# Patient Record
(demographics unavailable — no encounter records)

---

## 2025-04-23 NOTE — HISTORY OF PRESENT ILLNESS
[FreeTextEntry1] : 32yo M with autism presents for a follow-up visit. He had a SPLIT study 6/10/22 which was indicative of moderate ALVARO -- AHI 28.7/h. Optimal CPAP pressure was determined to be 10 cm h20. He is using CPAP on most nights.  He is accompanied by the group home attendant who states that patient is tolerating the Full-Face Mask and pressures well. Staff member states that the patient is alert and maintains wakefulness during the day program. Currently, the patient is alert and awake in the office. As per attendant, weight has been overall stable. Denies other interim changes to his health.   Data from [facility] was reviewed and is as follows: DME -- [facility]  Device -- Resmed Airsense 11AuroSet  Percent days with device usage in last 30 days > 4h -- 67% Average usage (days used) -- 4 h 50min Average treatment related SRAVANI -- 0.7/h Mask leakage -- excessive  Pressure --10 cm h20 EPR 1  [CPAP: ___ cmH2O] : CPAP: [unfilled] cmH2O [Nocturnal Oxygen] : The patient does not use nocturnal oxygen

## 2025-04-23 NOTE — REVIEW OF SYSTEMS
[Fatigue] : no fatigue [Snoring] : no snoring [Witnessed Apneas] : no witnessed apnea [A.M. Dry Mouth] : no a.m. dry mouth [CHF] : no congestive heart failure [Obesity] : obesity [Difficulty Maintaining Sleep] : no difficulty maintaining sleep [FreeTextEntry3] : obtained ROS from group home attendantTamy

## 2025-04-23 NOTE — PHYSICAL EXAM
[General Appearance - In No Acute Distress] : no acute distress [Normal Conjunctiva] : the conjunctiva exhibited no abnormalities [Low Lying Soft Palate] : low lying soft palate [IV] : IV [Heart Rate And Rhythm] : heart rate was normal and rhythm regular [Heart Sounds] : normal S1 and S2 [Murmurs] : no murmurs [] : no respiratory distress [Respiration, Rhythm And Depth] : normal respiratory rhythm and effort [Exaggerated Use Of Accessory Muscles For Inspiration] : no accessory muscle use [Auscultation Breath Sounds / Voice Sounds] : lungs were clear to auscultation bilaterally [Involuntary Movements] : no involuntary movements were seen [Nail Clubbing] : no clubbing of the fingernails [Non-Pitting] : non-pitting [Skin Color & Pigmentation] : normal skin color and pigmentation [No Focal Deficits] : no focal deficits [FreeTextEntry1] : autism

## 2025-04-23 NOTE — ASSESSMENT
[FreeTextEntry1] : 32yo M with autism presents for a follow-up visit. He had a SPLIT study 6/10/22 which was indicative of moderate ALVARO -- AHI 28.7/h. Optimal CPAP pressure was determined to be 10 cm h20. He is using CPAP on most nights.  He is accompanied by the group home attendant who states that patient is tolerating the Full-Face Mask and pressures well. She states that the patient is alert and maintains wakefulness during the day program. Currently, the patient is alert and awake in the office. As per attendant, weight has been overall stable. Denies other interim changes to his health.   Data from [employer] was reviewed and is as follows: DME -- [facility]  Device -- Resmed Airsense 11AuroSet  Percent days with device usage in last 30 days > 4h -- 67% Average usage (days used) -- 4 h 50min Average treatment related SRAVANI -- 0.7/h Mask leakage -- excessive  Pressure --10 cm h20 EPR 1  AHI is normalized on therapy and as per group home attendant, patient is without daytime somnolence. Advised group home attendant to have night staff assist in increasing CPAP use consistently on a nightly basis. Advised staff to perform hourly checks to ensure patient is using CPAP at night and to reapply therapy as needed.  I explained the rationale for treatment of ALVARO -- to improve quality of life, daytime function and to decrease the cardiometabolic and other medical risks that are associated with untreated ALVARO.  F/U in 6-12 months or sooner if needed.

## 2025-07-17 NOTE — PHYSICAL EXAM
[Alert] : alert [Sclera] : the sclera and conjunctiva were normal [No Respiratory Distress] : no respiratory distress [No Acc Muscle Use] : no accessory muscle use [Heart Rate And Rhythm] : heart rate was normal and rhythm regular [Normal S1, S2] : normal S1 and S2 [Murmurs] : no murmurs [Bowel Sounds] : normal bowel sounds [Abdomen Tenderness] : non-tender [No Masses] : no abdominal mass palpated [Abdomen Soft] : soft [] : no hepatosplenomegaly [de-identified] : nonverbal, developmental delay [de-identified] : Large nonthrombosed external hemorrhoid and palpable internal hemorrhoid, no blood seen (consent obtained from mother) [de-identified] : nonverbal, developmental delay

## 2025-07-17 NOTE — ASSESSMENT
[FreeTextEntry1] : Aron Oropeza is a 34 yo M with PMH of developmental delay, ALVARO here for constipation and hemorrhoid.  #Constipation #External hemorrhoid #Intermittent Hematochezia  Presenting with chronic functional constipation controlled on bowel regimen with symptoms of rectal itching and intermittent rectal bleeding, found to have large external nonthrombosed hemorrhoid likely cause of his symptoms. Hemorrhoid likely due to chronic constipation and straining  - Start sitz baths every 15 minutes nightly for hemorrhoids - C/w docusate 100 mg tid and bisacodyl 10 mg nightly - Continue preparation H and hydrocortisone cream - If symptoms do not improve, recommend colorectal surgery evaluation  RTO in 3 months

## 2025-07-17 NOTE — PHYSICAL EXAM
[Alert] : alert [Sclera] : the sclera and conjunctiva were normal [No Respiratory Distress] : no respiratory distress [No Acc Muscle Use] : no accessory muscle use [Heart Rate And Rhythm] : heart rate was normal and rhythm regular [Normal S1, S2] : normal S1 and S2 [Murmurs] : no murmurs [Bowel Sounds] : normal bowel sounds [Abdomen Tenderness] : non-tender [No Masses] : no abdominal mass palpated [Abdomen Soft] : soft [] : no hepatosplenomegaly [de-identified] : nonverbal, developmental delay [de-identified] : Large nonthrombosed external hemorrhoid and palpable internal hemorrhoid, no blood seen (consent obtained from mother) [de-identified] : nonverbal, developmental delay

## 2025-07-17 NOTE — HISTORY OF PRESENT ILLNESS
[FreeTextEntry1] : Aron Oropeza is a 34 yo M with PMH of developmental delay, ALVARO here for constipation and rectal bleeding/itching.   Nonverbal at baseline, accompanied by aide. Resides at group home Patient has history of constipation at baseline and takes docusate 100 mg tid and bisacodyl 10 mg nightly He has 2-3 formed BMs per day with that bowel regimen. Staff notes that he spends long time on toilet and holds his belly when he has a bowel movement  His mother notes that he has had rectal itching for the past few months and intermittently has blood in his stool.  He had blood in stool twice on Sunday once mixed with brown stool and once with blood filling toilet without stool.   He was diagnosed with hemorrhoid and started on preparation H and hydrocortisone cream bid on 7/2/25 by PCP.  No family history of colon cancer  Never had egd or colonoscopy  Consent obtained from mother for rectal exam

## 2025-07-17 NOTE — ASSESSMENT
[FreeTextEntry1] : Aron Oropeza is a 32 yo M with PMH of developmental delay, ALVARO here for constipation and hemorrhoid.  #Constipation #External hemorrhoid #Intermittent Hematochezia  Presenting with chronic functional constipation controlled on bowel regimen with symptoms of rectal itching and intermittent rectal bleeding, found to have large external nonthrombosed hemorrhoid likely cause of his symptoms. Hemorrhoid likely due to chronic constipation and straining  - Start sitz baths every 15 minutes nightly for hemorrhoids - C/w docusate 100 mg tid and bisacodyl 10 mg nightly - Continue preparation H and hydrocortisone cream - If symptoms do not improve, recommend colorectal surgery evaluation  RTO in 3 months

## 2025-07-17 NOTE — END OF VISIT
[FreeTextEntry3] : 33M non verbal/autism lives in group home on bowel regimen bisacodyl 2 tabs QHS, colace TID has perianal itching and new onset BRBPR with one episode of cherrie blood saw his PCP found hemorrhoids with hydrocortisone BID since 7/2 Rectal exam performed in presence of his caretaker and Dr. Tristan demonstrates large external hemorrhoid, nonthrombosed Recommendations sitz bath c/w laxatives hemorrhoid cream if no resolution, will need colorectal